# Patient Record
Sex: MALE | Race: WHITE | NOT HISPANIC OR LATINO | URBAN - METROPOLITAN AREA
[De-identification: names, ages, dates, MRNs, and addresses within clinical notes are randomized per-mention and may not be internally consistent; named-entity substitution may affect disease eponyms.]

---

## 2023-04-03 ENCOUNTER — OFFICE VISIT (OUTPATIENT)
Dept: URGENT CARE | Facility: CLINIC | Age: 28
End: 2023-04-03

## 2023-04-03 VITALS
RESPIRATION RATE: 16 BRPM | DIASTOLIC BLOOD PRESSURE: 61 MMHG | SYSTOLIC BLOOD PRESSURE: 133 MMHG | HEART RATE: 93 BPM | TEMPERATURE: 98.8 F | OXYGEN SATURATION: 100 %

## 2023-04-03 DIAGNOSIS — J30.2 SEASONAL ALLERGIC RHINITIS, UNSPECIFIED TRIGGER: Primary | ICD-10-CM

## 2023-04-03 RX ORDER — LORATADINE 10 MG/1
10 TABLET ORAL DAILY
Qty: 30 TABLET | Refills: 0 | Status: SHIPPED | OUTPATIENT
Start: 2023-04-03

## 2023-04-03 RX ORDER — MONTELUKAST SODIUM 10 MG/1
10 TABLET ORAL
Qty: 30 TABLET | Refills: 0 | Status: SHIPPED | OUTPATIENT
Start: 2023-04-03

## 2023-04-03 NOTE — PROGRESS NOTES
[facility]        NAME: Oscar Atkinson is a 33 y o  male  : 1995    MRN: [id number]  DATE: April 3, 2023  TIME: 4:01 PM    Assessment and Plan   Seasonal allergic rhinitis, unspecified trigger [J30 2]  1  Seasonal allergic rhinitis, unspecified trigger  montelukast (SINGULAIR) 10 mg tablet    loratadine (CLARITIN) 10 mg tablet            Patient Instructions     Allergy medication given today for allergies - possibly to new pet cat  No signs of infection on exam  Follow-up with PCP in the next 3-5 days if no improvement  Go to the ED if symptoms severely worsen  Chief Complaint     Chief Complaint   Patient presents with   • Nasal Congestion     Over past couple months has gotten a sore throat, congestion, ear discomfort numerous times  States he has had this issue once a month for several months Had sore throat yesterday which has subsided but is still having congestion  History of Present Illness     Oscar Atkinson is a 33 y o  male presenting to the office today for allergy complaints  He states that he has had congestion and a sore throat on and off for the past 3 months  He notes that typically easing takes care of his symptoms  However, he is concerned about the frequency and when she is having this congestion  He does admit to recently adopting a cat 6 months ago  The cat is allowed in the bedroom and on the bed  He has not tried allergy medicine or decongestants for his symptoms  Review of Systems     Review of Systems   Constitutional: Negative for chills, fatigue and fever  HENT: Positive for congestion, postnasal drip and sore throat  Negative for ear discharge, ear pain, sinus pressure and sinus pain  Eyes: Negative for pain and discharge  Respiratory: Negative for cough and shortness of breath  Cardiovascular: Negative for chest pain and palpitations  Gastrointestinal: Negative for abdominal pain, diarrhea, nausea and vomiting     Genitourinary: Negative for difficulty urinating and dysuria  Musculoskeletal: Negative for arthralgias and myalgias  Skin: Negative for rash  Neurological: Negative for dizziness, syncope, light-headedness, numbness and headaches  Psychiatric/Behavioral: Negative for agitation  All other systems reviewed and are negative  Current Medications       Current Outpatient Medications:   •  loratadine (CLARITIN) 10 mg tablet, Take 1 tablet (10 mg total) by mouth daily, Disp: 30 tablet, Rfl: 0  •  montelukast (SINGULAIR) 10 mg tablet, Take 1 tablet (10 mg total) by mouth daily at bedtime, Disp: 30 tablet, Rfl: 0    Current Allergies     Allergies as of 04/03/2023   • (No Known Allergies)            The following portions of the patient's history were reviewed and updated as appropriate: allergies, current medications, past family history, past medical history, past social history, past surgical history and problem list      No past medical history on file  No past surgical history on file  No family history on file  Medications have been verified  Objective     /61   Pulse 93   Temp 98 8 °F (37 1 °C) (Temporal)   Resp 16   SpO2 100%   No LMP for male patient  Physical Exam     Physical Exam  Vitals reviewed  Constitutional:       General: He is not in acute distress  Appearance: Normal appearance  He is not ill-appearing  HENT:      Head: Normocephalic and atraumatic  Right Ear: Tympanic membrane and ear canal normal  No middle ear effusion  Left Ear: Tympanic membrane and ear canal normal   No middle ear effusion  Mouth/Throat:      Mouth: Mucous membranes are moist       Pharynx: Posterior oropharyngeal erythema present  No oropharyngeal exudate  Tonsils: No tonsillar exudate  Eyes:      Extraocular Movements: Extraocular movements intact  Conjunctiva/sclera: Conjunctivae normal       Pupils: Pupils are equal, round, and reactive to light     Cardiovascular: Rate and Rhythm: Normal rate and regular rhythm  Pulses: Normal pulses  Heart sounds: Normal heart sounds  No murmur heard  Pulmonary:      Effort: Pulmonary effort is normal  No respiratory distress  Breath sounds: Normal breath sounds  No wheezing  Musculoskeletal:      Cervical back: Normal range of motion and neck supple  No tenderness  Lymphadenopathy:      Cervical: No cervical adenopathy  Skin:     General: Skin is warm  Neurological:      General: No focal deficit present  Mental Status: He is alert     Psychiatric:         Mood and Affect: Mood normal          Behavior: Behavior normal          Judgment: Judgment normal

## 2024-07-17 ENCOUNTER — OFFICE VISIT (OUTPATIENT)
Dept: URGENT CARE | Facility: CLINIC | Age: 29
End: 2024-07-17
Payer: COMMERCIAL

## 2024-07-17 VITALS
SYSTOLIC BLOOD PRESSURE: 173 MMHG | OXYGEN SATURATION: 100 % | HEART RATE: 97 BPM | DIASTOLIC BLOOD PRESSURE: 75 MMHG | TEMPERATURE: 98.1 F | RESPIRATION RATE: 16 BRPM | HEIGHT: 73 IN | BODY MASS INDEX: 34.5 KG/M2 | WEIGHT: 260.3 LBS

## 2024-07-17 DIAGNOSIS — H66.002 ACUTE SUPPURATIVE OTITIS MEDIA OF LEFT EAR WITHOUT SPONTANEOUS RUPTURE OF TYMPANIC MEMBRANE, RECURRENCE NOT SPECIFIED: ICD-10-CM

## 2024-07-17 DIAGNOSIS — H61.22 IMPACTED CERUMEN OF LEFT EAR: Primary | ICD-10-CM

## 2024-07-17 DIAGNOSIS — H60.392 OTHER INFECTIVE ACUTE OTITIS EXTERNA OF LEFT EAR: ICD-10-CM

## 2024-07-17 PROCEDURE — 69209 REMOVE IMPACTED EAR WAX UNI: CPT | Performed by: NURSE PRACTITIONER

## 2024-07-17 PROCEDURE — S9083 URGENT CARE CENTER GLOBAL: HCPCS | Performed by: NURSE PRACTITIONER

## 2024-07-17 PROCEDURE — 99214 OFFICE O/P EST MOD 30 MIN: CPT | Performed by: NURSE PRACTITIONER

## 2024-07-17 RX ORDER — AMOXICILLIN 875 MG/1
875 TABLET, COATED ORAL 2 TIMES DAILY
Qty: 14 TABLET | Refills: 0 | Status: SHIPPED | OUTPATIENT
Start: 2024-07-17 | End: 2024-07-24

## 2024-07-17 NOTE — PATIENT INSTRUCTIONS
--Dry ears as instructed  --Rx ear drops as prescribed x 5 days  --Fill and start antibiotic if ongoing/worsening ear pain over the next 48-72 hours despite these measures  --Seek re-evaluation for any recurrent issues.

## 2024-07-17 NOTE — PROGRESS NOTES
[facility]        NAME: Callum Crook is a 29 y.o. male  : 1995    MRN: [id number]  DATE: 2024  TIME: 11:32 AM    Assessment and Plan   Impacted cerumen of left ear [H61.22]  1. Impacted cerumen of left ear        2. Acute suppurative otitis media of left ear without spontaneous rupture of tympanic membrane, recurrence not specified  amoxicillin (AMOXIL) 875 mg tablet      3. Other infective acute otitis externa of left ear  ciprofloxacin-hydrocortisone (CIPRO HC OTIC) otic suspension            Patient Instructions     --Dry ears as instructed  --Rx ear drops as prescribed x 5 days  --Fill and start antibiotic if ongoing/worsening ear pain over the next 48-72 hours despite these measures  --Seek re-evaluation for any recurrent issues.     If tests have been performed at [facility], our office will contact you with results if changes need to be made to the care plan discussed with you at the visit.  [facility]    Chief Complaint     Chief Complaint   Patient presents with    Earache     Pt reports having pain in his left ear. He stated that it feels like it is throbbing and moving down into his jaw after several self care remedies.          History of Present Illness       Here with complaints of intermittent left ear discomfort over the past few weeks.  More bothersome over the past couple of days.  Starting to radiate to jaw.  Associated hearing loss.    No otorrhea, itching.   No fever, URI/allergy symptoms.    No relief despite using OTC ear drops, Q-tip.     Right ear feels fine.          Review of Systems   Review of Systems   Constitutional:  Negative for fever.   HENT:  Positive for ear pain and hearing loss. Negative for rhinorrhea.    Neurological:  Negative for headaches.         Current Medications       Current Outpatient Medications:     amoxicillin (AMOXIL) 875 mg tablet, Take 1 tablet (875 mg total) by mouth 2 (two) times a day  "for 7 days, Disp: 14 tablet, Rfl: 0    ciprofloxacin-hydrocortisone (CIPRO HC OTIC) otic suspension, Administer 3 drops into the left ear 2 (two) times a day, Disp: 10 mL, Rfl: 0    loratadine (CLARITIN) 10 mg tablet, Take 1 tablet (10 mg total) by mouth daily (Patient not taking: Reported on 7/17/2024), Disp: 30 tablet, Rfl: 0    montelukast (SINGULAIR) 10 mg tablet, Take 1 tablet (10 mg total) by mouth daily at bedtime (Patient not taking: Reported on 7/17/2024), Disp: 30 tablet, Rfl: 0    Current Allergies     Allergies as of 07/17/2024    (No Known Allergies)            The following portions of the patient's history were reviewed and updated as appropriate: allergies, current medications, past family history, past medical history, past social history, past surgical history and problem list.     History reviewed. No pertinent past medical history.    No past surgical history on file.    No family history on file.      Medications have been verified.        Objective   BP (!) 173/75   Pulse 97   Temp 98.1 °F (36.7 °C)   Resp 16   Ht 6' 1\" (1.854 m)   Wt 118 kg (260 lb 4.8 oz)   SpO2 100%   BMI 34.34 kg/m²   No LMP for male patient.       Physical Exam     Physical Exam  Constitutional:       General: He is not in acute distress.  HENT:      Right Ear: Tympanic membrane, ear canal and external ear normal. There is no impacted cerumen.      Left Ear: There is impacted cerumen.      Ears:      Comments: Left ear canal 100% occluded with cerumen.  S/p removal after 10 minutes of warm water lavage, canal free of wax, erythematous, mildly swollen.  No otorrhea.  Superior aspect of TM, intact erythematous with mild bulge.    Mild tragus, pinna tenderness.       Nose: No congestion.   Lymphadenopathy:      Cervical: No cervical adenopathy.   Neurological:      Mental Status: He is alert.     Ear cerumen removal    Date/Time: 7/17/2024 11:00 AM    Performed by: REID Christensen  Authorized by: REID Christensen  " Universal Protocol:  Consent: Verbal consent obtained.  Risks and benefits: risks, benefits and alternatives were discussed  Consent given by: patient  Patient understanding: patient states understanding of the procedure being performed  Patient consent: the patient's understanding of the procedure matches consent given  Procedure consent: procedure consent matches procedure scheduled  Patient identity confirmed: verbally with patient    Patient location:  Clinic  Indications / Diagnosis:  SEE EXAM  Procedure details:     Location:  L ear    Procedure type: irrigation only      Approach:  Natural orifice    Visualization (free text):  SEE EXAM  Post-procedure details:     Complication:  None    Hearing quality:  Normal    Patient tolerance of procedure:  Tolerated well, no immediate complications